# Patient Record
Sex: FEMALE | Race: WHITE | NOT HISPANIC OR LATINO | URBAN - METROPOLITAN AREA
[De-identification: names, ages, dates, MRNs, and addresses within clinical notes are randomized per-mention and may not be internally consistent; named-entity substitution may affect disease eponyms.]

---

## 2019-01-27 ENCOUNTER — EMERGENCY (EMERGENCY)
Facility: HOSPITAL | Age: 39
LOS: 1 days | Discharge: HOME | End: 2019-01-27
Attending: EMERGENCY MEDICINE | Admitting: EMERGENCY MEDICINE
Payer: COMMERCIAL

## 2019-01-27 VITALS
DIASTOLIC BLOOD PRESSURE: 105 MMHG | HEART RATE: 93 BPM | SYSTOLIC BLOOD PRESSURE: 144 MMHG | TEMPERATURE: 98 F | OXYGEN SATURATION: 100 % | RESPIRATION RATE: 16 BRPM

## 2019-01-27 PROCEDURE — 99284 EMERGENCY DEPT VISIT MOD MDM: CPT | Mod: 25

## 2019-01-27 PROCEDURE — 93010 ELECTROCARDIOGRAM REPORT: CPT

## 2019-01-27 NOTE — ED PROVIDER NOTE - CONSTITUTIONAL, MLM
normal... Well appearing, well nourished, awake, alert, oriented to person, place, time/situation and in no apparent distress. tearful

## 2019-01-27 NOTE — ED PROVIDER NOTE - MEDICAL DECISION MAKING DETAILS
39 yr old female with hx of IBS, GERD, pre-eclampsia and gestational dm presents to ed c/o substernal chest pressure, , left shoulder pain x 1 wk.  seeing outside cardiologist and has echo scheduled monday and stress this thrusday.  pt also been checking bp at home and seeing 140/105 and is scaring her.  admits having a lot of stress at home caring for her children including 4 yr old twin    low cardiac risk disease and pt already has echo and stress this week.  will check ekg and liekly dc.  vss and in ed will not treat mild HTN. PERC NEG.  pt will see cardio and encourage to see psych and stress reduction technique.   also made aware of si symptoms.

## 2019-01-27 NOTE — ED ADULT TRIAGE NOTE - CHIEF COMPLAINT QUOTE
pt arrives w/ c/o elevated bp with left sided shoulder pain. pt states see a cardiologist for re-occurring chest pain and has a stress test ordered for this week. pt states she has been under a lot of stress and has been experiencing dizziness.

## 2019-01-27 NOTE — ED PROVIDER NOTE - OBJECTIVE STATEMENT
39 yr old female with hx of IBS, GERD, pre-eclampsia and gestational dm presents to ed c/o substernal chest pressure, , left shoulder pain x 1 wk.  seeing outside cardiologist and has echo scheduled monday and stress this thrusday.  pt also been checking bp at home and seeing 140/105 and is scaring her.  admits having a lot of stress at home caring for her children including 4 yr old twin.   at bedside states pt is very anxious and pt admits everything stress her out (was tearful at bedside). no ha, no si or HI. no fever, no chills, no visual changes, no headache, no numbness or tingling, no sob, no cough,  no palpitations, no leg swelling, no syncope, no abd pain, no n/v/d, no dysuria, no rashes.  no fam hx of MI but mother and grandmother with cad (pt they are not healthy at baseline)

## 2019-02-11 ENCOUNTER — EMERGENCY (EMERGENCY)
Facility: HOSPITAL | Age: 39
LOS: 1 days | Discharge: HOME | End: 2019-02-11
Attending: EMERGENCY MEDICINE
Payer: COMMERCIAL

## 2019-02-11 VITALS
SYSTOLIC BLOOD PRESSURE: 156 MMHG | RESPIRATION RATE: 18 BRPM | TEMPERATURE: 98 F | DIASTOLIC BLOOD PRESSURE: 92 MMHG | OXYGEN SATURATION: 100 % | HEART RATE: 124 BPM

## 2019-02-11 VITALS
WEIGHT: 182.98 LBS | SYSTOLIC BLOOD PRESSURE: 177 MMHG | HEIGHT: 64 IN | RESPIRATION RATE: 917 BRPM | HEART RATE: 119 BPM | DIASTOLIC BLOOD PRESSURE: 97 MMHG | OXYGEN SATURATION: 98 % | TEMPERATURE: 98 F

## 2019-02-11 DIAGNOSIS — Z98.891 HISTORY OF UTERINE SCAR FROM PREVIOUS SURGERY: Chronic | ICD-10-CM

## 2019-02-11 LAB
ALBUMIN SERPL ELPH-MCNC: 4.2 G/DL — SIGNIFICANT CHANGE UP (ref 3.5–5)
ALP SERPL-CCNC: 68 U/L — SIGNIFICANT CHANGE UP (ref 40–120)
ALT FLD-CCNC: 38 U/L DA — SIGNIFICANT CHANGE UP (ref 10–60)
ANION GAP SERPL CALC-SCNC: 7 MMOL/L — SIGNIFICANT CHANGE UP (ref 5–17)
APPEARANCE UR: CLEAR — SIGNIFICANT CHANGE UP
APTT BLD: 29.5 SEC — SIGNIFICANT CHANGE UP (ref 27.5–36.3)
AST SERPL-CCNC: 30 U/L — SIGNIFICANT CHANGE UP (ref 10–40)
BACTERIA # UR AUTO: ABNORMAL /HPF
BASOPHILS # BLD AUTO: 0.1 K/UL — SIGNIFICANT CHANGE UP (ref 0–0.2)
BASOPHILS NFR BLD AUTO: 0.5 % — SIGNIFICANT CHANGE UP (ref 0–2)
BILIRUB SERPL-MCNC: 0.4 MG/DL — SIGNIFICANT CHANGE UP (ref 0.2–1.2)
BILIRUB UR-MCNC: NEGATIVE — SIGNIFICANT CHANGE UP
BUN SERPL-MCNC: 9 MG/DL — SIGNIFICANT CHANGE UP (ref 7–18)
CALCIUM SERPL-MCNC: 9.2 MG/DL — SIGNIFICANT CHANGE UP (ref 8.4–10.5)
CHLORIDE SERPL-SCNC: 112 MMOL/L — HIGH (ref 96–108)
CK SERPL-CCNC: 212 U/L — SIGNIFICANT CHANGE UP (ref 21–215)
CO2 SERPL-SCNC: 23 MMOL/L — SIGNIFICANT CHANGE UP (ref 22–31)
COLOR SPEC: YELLOW — SIGNIFICANT CHANGE UP
CREAT SERPL-MCNC: 0.7 MG/DL — SIGNIFICANT CHANGE UP (ref 0.5–1.3)
D DIMER BLD IA.RAPID-MCNC: <150 NG/ML DDU — SIGNIFICANT CHANGE UP
DIFF PNL FLD: ABNORMAL
EOSINOPHIL # BLD AUTO: 0 K/UL — SIGNIFICANT CHANGE UP (ref 0–0.5)
EOSINOPHIL NFR BLD AUTO: 0.2 % — SIGNIFICANT CHANGE UP (ref 0–6)
EPI CELLS # UR: SIGNIFICANT CHANGE UP /HPF
GLUCOSE SERPL-MCNC: 114 MG/DL — HIGH (ref 70–99)
GLUCOSE UR QL: NEGATIVE — SIGNIFICANT CHANGE UP
HCG SERPL-ACNC: <1 MIU/ML — SIGNIFICANT CHANGE UP
HCG UR QL: NEGATIVE — SIGNIFICANT CHANGE UP
HCT VFR BLD CALC: 45.7 % — HIGH (ref 34.5–45)
HGB BLD-MCNC: 15.1 G/DL — SIGNIFICANT CHANGE UP (ref 11.5–15.5)
INR BLD: 1.04 RATIO — SIGNIFICANT CHANGE UP (ref 0.88–1.16)
KETONES UR-MCNC: NEGATIVE — SIGNIFICANT CHANGE UP
LEUKOCYTE ESTERASE UR-ACNC: NEGATIVE — SIGNIFICANT CHANGE UP
LYMPHOCYTES # BLD AUTO: 1.7 K/UL — SIGNIFICANT CHANGE UP (ref 1–3.3)
LYMPHOCYTES # BLD AUTO: 12.4 % — LOW (ref 13–44)
MAGNESIUM SERPL-MCNC: 2.2 MG/DL — SIGNIFICANT CHANGE UP (ref 1.6–2.6)
MCHC RBC-ENTMCNC: 29.4 PG — SIGNIFICANT CHANGE UP (ref 27–34)
MCHC RBC-ENTMCNC: 33.1 GM/DL — SIGNIFICANT CHANGE UP (ref 32–36)
MCV RBC AUTO: 88.7 FL — SIGNIFICANT CHANGE UP (ref 80–100)
MONOCYTES # BLD AUTO: 0.5 K/UL — SIGNIFICANT CHANGE UP (ref 0–0.9)
MONOCYTES NFR BLD AUTO: 3.4 % — SIGNIFICANT CHANGE UP (ref 2–14)
NEUTROPHILS # BLD AUTO: 11.2 K/UL — HIGH (ref 1.8–7.4)
NEUTROPHILS NFR BLD AUTO: 83.5 % — HIGH (ref 43–77)
NITRITE UR-MCNC: NEGATIVE — SIGNIFICANT CHANGE UP
PH UR: 6 — SIGNIFICANT CHANGE UP (ref 5–8)
PLATELET # BLD AUTO: 236 K/UL — SIGNIFICANT CHANGE UP (ref 150–400)
POTASSIUM SERPL-MCNC: 3.9 MMOL/L — SIGNIFICANT CHANGE UP (ref 3.5–5.3)
POTASSIUM SERPL-SCNC: 3.9 MMOL/L — SIGNIFICANT CHANGE UP (ref 3.5–5.3)
PROT SERPL-MCNC: 8.5 G/DL — HIGH (ref 6–8.3)
PROT UR-MCNC: 15
PROTHROM AB SERPL-ACNC: 11.6 SEC — SIGNIFICANT CHANGE UP (ref 10–12.9)
RBC # BLD: 5.15 M/UL — SIGNIFICANT CHANGE UP (ref 3.8–5.2)
RBC # FLD: 12.1 % — SIGNIFICANT CHANGE UP (ref 10.3–14.5)
RBC CASTS # UR COMP ASSIST: ABNORMAL /HPF (ref 0–2)
SODIUM SERPL-SCNC: 142 MMOL/L — SIGNIFICANT CHANGE UP (ref 135–145)
SP GR SPEC: 1.01 — SIGNIFICANT CHANGE UP (ref 1.01–1.02)
TROPONIN I SERPL-MCNC: <0.015 NG/ML — SIGNIFICANT CHANGE UP (ref 0–0.04)
UROBILINOGEN FLD QL: NEGATIVE — SIGNIFICANT CHANGE UP
WBC # BLD: 13.4 K/UL — HIGH (ref 3.8–10.5)
WBC # FLD AUTO: 13.4 K/UL — HIGH (ref 3.8–10.5)
WBC UR QL: SIGNIFICANT CHANGE UP /HPF (ref 0–5)

## 2019-02-11 PROCEDURE — 84702 CHORIONIC GONADOTROPIN TEST: CPT

## 2019-02-11 PROCEDURE — 99284 EMERGENCY DEPT VISIT MOD MDM: CPT | Mod: 25

## 2019-02-11 PROCEDURE — 81001 URINALYSIS AUTO W/SCOPE: CPT

## 2019-02-11 PROCEDURE — 85027 COMPLETE CBC AUTOMATED: CPT

## 2019-02-11 PROCEDURE — 84484 ASSAY OF TROPONIN QUANT: CPT

## 2019-02-11 PROCEDURE — 80053 COMPREHEN METABOLIC PANEL: CPT

## 2019-02-11 PROCEDURE — 85610 PROTHROMBIN TIME: CPT

## 2019-02-11 PROCEDURE — 96374 THER/PROPH/DIAG INJ IV PUSH: CPT

## 2019-02-11 PROCEDURE — 71046 X-RAY EXAM CHEST 2 VIEWS: CPT | Mod: 26

## 2019-02-11 PROCEDURE — 81025 URINE PREGNANCY TEST: CPT

## 2019-02-11 PROCEDURE — 93005 ELECTROCARDIOGRAM TRACING: CPT

## 2019-02-11 PROCEDURE — 36415 COLL VENOUS BLD VENIPUNCTURE: CPT

## 2019-02-11 PROCEDURE — 82550 ASSAY OF CK (CPK): CPT

## 2019-02-11 PROCEDURE — 83735 ASSAY OF MAGNESIUM: CPT

## 2019-02-11 PROCEDURE — 85379 FIBRIN DEGRADATION QUANT: CPT

## 2019-02-11 PROCEDURE — 71046 X-RAY EXAM CHEST 2 VIEWS: CPT

## 2019-02-11 PROCEDURE — 85730 THROMBOPLASTIN TIME PARTIAL: CPT

## 2019-02-11 PROCEDURE — 99285 EMERGENCY DEPT VISIT HI MDM: CPT | Mod: 25

## 2019-02-11 RX ORDER — KETOROLAC TROMETHAMINE 30 MG/ML
30 SYRINGE (ML) INJECTION ONCE
Qty: 0 | Refills: 0 | Status: DISCONTINUED | OUTPATIENT
Start: 2019-02-11 | End: 2019-02-11

## 2019-02-11 RX ORDER — DIPHENHYDRAMINE HCL 50 MG
50 CAPSULE ORAL ONCE
Qty: 0 | Refills: 0 | Status: DISCONTINUED | OUTPATIENT
Start: 2019-02-11 | End: 2019-02-15

## 2019-02-11 RX ADMIN — Medication 30 MILLIGRAM(S): at 22:15

## 2019-02-11 RX ADMIN — Medication 1 MILLIGRAM(S): at 20:49

## 2019-02-11 NOTE — ED PROVIDER NOTE - MEDICAL DECISION MAKING DETAILS
40 y/o F pt presents with atypical chest pain with anxiety component. Will check labs, D-dimer, EKG and reassess.

## 2019-02-11 NOTE — ED ADULT NURSE NOTE - NSIMPLEMENTINTERV_GEN_ALL_ED
Implemented All Universal Safety Interventions:  [place] to call system. Call bell, personal items and telephone within reach. Instruct patient to call for assistance. Room bathroom lighting operational. Non-slip footwear when patient is off stretcher. Physically safe environment: no spills, clutter or unnecessary equipment. Stretcher in lowest position, wheels locked, appropriate side rails in place.

## 2019-02-11 NOTE — ED PROVIDER NOTE - PROGRESS NOTE DETAILS
pt with atypical CP, case d/w Dr. Liu covering Dr. Haley, will report to Dr. Haley in am anxiety much improves, pt claims she has white coat syndrome Pt feeling much better, CP improves, will d/c hoem

## 2019-02-11 NOTE — ED PROVIDER NOTE - OBJECTIVE STATEMENT
40 y/o F pt with PMHx of GERD, HPV, HTN, IBS, and PSHx of  and loop electrosurgical excision presents to ED c/o mid chest pain, radiating to L side and L upper back x today. Pt describes pain as constant and reports onset of pain while carrying her bag after leaving work. Pt relays that pain is worse with breathing deeply. Pt does not report any associated shortness of breath, hyperventilation, nor is pain worse with movement. Of note, pt reports recent sick contacts at home, stating that her son was recently dx with the flu. Pt follows up with cardiologist, Dr. Haley, for HTN (not on medications) and reports recent nl stress test and EKG. In ED, pt states she feels improved, however, is c/o mild dizziness. Pt took baby ASA PTA. Patient denies fever, chills, nausea, vomiting, recent travel, OCPs, or any other complaints. LMP: ~3 weeks ago. ALLERGIES: sulfa drugs.

## 2019-02-11 NOTE — ED PROVIDER NOTE - PMH
GERD (gastroesophageal reflux disease)    HPV (human papilloma virus) infection    HTN (hypertension)    IBS (irritable bowel syndrome)    Missed

## 2019-02-25 ENCOUNTER — OUTPATIENT (OUTPATIENT)
Dept: OUTPATIENT SERVICES | Facility: HOSPITAL | Age: 39
LOS: 1 days | Discharge: HOME | End: 2019-02-25

## 2019-02-25 DIAGNOSIS — Z98.891 HISTORY OF UTERINE SCAR FROM PREVIOUS SURGERY: Chronic | ICD-10-CM

## 2019-02-25 PROBLEM — I10 ESSENTIAL (PRIMARY) HYPERTENSION: Chronic | Status: ACTIVE | Noted: 2019-02-11

## 2019-03-21 DIAGNOSIS — F41.1 GENERALIZED ANXIETY DISORDER: ICD-10-CM

## 2021-01-14 NOTE — ED PROVIDER NOTE - CHPI ED SYMPTOMS NEG
Wound Care: Vaseline Validate Triangulation: No Type Of Destruction Used: Curettage Cryotherapy Text: The wound bed was treated with cryotherapy after the biopsy was performed. Was A Bandage Applied: Yes Anesthesia Volume In Cc: 0.2 Curettage Text: The wound bed was treated with curettage after the biopsy was performed. Notification Instructions: Patient will be notified of biopsy results. However, patient instructed to call the office if not contacted within 2 weeks. Biopsy Type: H and E no shortness of breath/no vomiting/no nausea/no chills/no fever Additional Anesthesia Volume In Cc (Will Not Render If 0): 0 Silver Nitrate Text: The wound bed was treated with silver nitrate after the biopsy was performed. Size Of Lesion In Cm: 1.3 Electrodesiccation And Curettage Text: The wound bed was treated with electrodesiccation and curettage after the biopsy was performed. Body Location Override (Optional - Billing Will Still Be Based On Selected Body Map Location If Applicable): left mid back Detail Level: Detailed Body Location Override (Optional - Billing Will Still Be Based On Selected Body Map Location If Applicable): midline upper forehead at hairline Information: Selecting Yes will display possible errors in your note based on the variables you have selected. This validation is only offered as a suggestion for you. PLEASE NOTE THAT THE VALIDATION TEXT WILL BE REMOVED WHEN YOU FINALIZE YOUR NOTE. IF YOU WANT TO FAX A PRELIMINARY NOTE YOU WILL NEED TO TOGGLE THIS TO 'NO' IF YOU DO NOT WANT IT IN YOUR FAXED NOTE. Electrodesiccation Text: The wound bed was treated with electrodesiccation after the biopsy was performed. consent was obtained and risks were reviewed including but not limited to scarring, infection, bleeding, scabbing, incomplete removal, nerve damage and allergy to anesthesia. Hemostasis: Aluminum Chloride Anesthesia Type: 0.5% lidocaine with 1:200,000 epinephrine and a 1:10 solution of 8.4% sodium bicarbonate Post-Care Instructions: I reviewed with the patient in detail post-care instructions. Patient is to keep the biopsy site dry overnight, then wash with soap and water and apply ointment daily until healed. Size Of Lesion In Cm: 0.5 Dressing: bandage Size Of Lesion In Cm: 1 Body Location Override (Optional - Billing Will Still Be Based On Selected Body Map Location If Applicable): right lower anterior chestwall Depth Of Biopsy: dermis Body Location Override (Optional - Billing Will Still Be Based On Selected Body Map Location If Applicable): left chest Billing Type: Third-Party Bill Biopsy Method: double edge Personna blade Size Of Lesion In Cm: 1.5

## 2022-08-17 ENCOUNTER — APPOINTMENT (OUTPATIENT)
Dept: ORTHOPEDIC SURGERY | Facility: CLINIC | Age: 42
End: 2022-08-17

## 2022-08-18 ENCOUNTER — APPOINTMENT (OUTPATIENT)
Dept: ORTHOPEDIC SURGERY | Facility: CLINIC | Age: 42
End: 2022-08-18

## 2022-08-18 ENCOUNTER — TRANSCRIPTION ENCOUNTER (OUTPATIENT)
Age: 42
End: 2022-08-18

## 2022-08-18 DIAGNOSIS — M25.562 PAIN IN LEFT KNEE: ICD-10-CM

## 2022-08-18 DIAGNOSIS — R22.42 LOCALIZED SWELLING, MASS AND LUMP, LEFT LOWER LIMB: ICD-10-CM

## 2022-08-18 PROCEDURE — 99214 OFFICE O/P EST MOD 30 MIN: CPT

## 2022-08-18 PROCEDURE — 73564 X-RAY EXAM KNEE 4 OR MORE: CPT | Mod: LT

## 2022-08-18 PROCEDURE — 99204 OFFICE O/P NEW MOD 45 MIN: CPT

## 2022-08-18 RX ORDER — MELOXICAM 15 MG/1
15 TABLET ORAL DAILY
Qty: 30 | Refills: 0 | Status: ACTIVE | COMMUNITY
Start: 2022-08-18 | End: 1900-01-01

## 2022-08-18 NOTE — ASSESSMENT
[FreeTextEntry1] : I have explained that the symptoms and findings are most consistent with inflammation of the patella which is commonly called chondromalacia or patellofemoral pain syndrome. The condition occurs in patients of all ages for various reasons. It is not unusual for the symptoms to persist for a number of years but are usually intermittent in intensity. I have recommended treatment with some moderation in activities when the symptoms are more severe. In addition, I discussed the use of anti-inflammatory medications which a prescription was sent to their pharmacy. The usage and potential side effects of these medications were explained to the patient. Quadriceps strengthening exercises are also helpful and these were described. Other treatments, including surgery, are occasionally necessary if the symptoms are severe and do not respond to the conservative treatment measures. In some cases arthroscopic procedures can be utilized to improve patellar alignment and tracking and as a result reduce pain.\par In addition I would like to order an MRI of the left leg with and without contrast to further evaluate the soft tissue mass as well as a MRI of the knee to further evaluate the articular cartilage of the patellofemoral articulation

## 2022-08-18 NOTE — PHYSICAL EXAM
[de-identified] : General: Well-nourished, in no apparent distress\par Psych: Clear speech, pleasant mood and affect\par Eyes: Extraocular movements intact, no scleral icterus, pupils are symmetric\par Neurological: Alert and oriented to person, place, and time\par Gait: Normal\par Respiratory: Normal respiratory effort\par Lymph: No lymphedema present\par Skin: No rashes, no lesions, no open skin, no ecchymosis, no erythema.\par \par On examination of the knee: Knee alignment is in slight valgus. There is mild swelling along the anterior aspect of the knee joint with a mild effusion noted. No erythema induration or warmth is noted. There is no pain over the proximal midshaft or distal femur. No pain over the distal femoral condyles. No pain over the medial joint line. No pain over the MCL or LCL or tibial plateau. No pain over the fibular head. No pain over the quadriceps tendon, patellar, patellar tendon or tibial tubercle. No pain over the patellar retinaculum medial. The patella appears to be tracking slightly laterally.  She is tender over the lateral patellofemoral articulation and retinaculum.  She is mildly tender over the lateral knee joint line.  She has no pain over the proximal midshaft or distal tibia fibula ankle or foot.  There is a palpable soft tissue fullness over the anterior aspect of the midportion of her leg which is soft and nontender and is more prominent than the contralateral extremity.  Negative patellar apprehension test. No pain or mass over the popliteal fossa. There is good range of motion of the knee with flexion of 130 degrees of full extension with 5/5 strength of the quadriceps and hamstrings.  There is grinding noted of the patellofemoral articulation with deep knee flexion and extension.  No instability on varus valgus stress and negative anterior and posterior drawer testing. There is no pain over the proximal midshaft or distal tibia or fibula. The calf is soft and nontender with a downgoing Souza test. There is full dorsiflexion plantarflexion inversion and eversion of the ankle and hindfoot with 5/5 strength throughout muscle groups. No pain over the midfoot or forefoot. Sensation is grossly intact throughout to light touch Down Babinski test. 2+ patellar tendon and Achilles tendon reflexes and palpable dorsalis pedis and posterior tibial pulses.\par X-rays done today in the office 3 views of the knee which reveal no acute fractures or dislocations the knee joint is reduced and well aligned and the patella appears to be tracking well\par

## 2022-08-18 NOTE — HISTORY OF PRESENT ILLNESS
[Gradual] : gradual [Dull/Aching] : dull/aching [Localized] : localized [Intermittent] : intermittent [Stairs] : stairs [de-identified] : Ms. WHEELER is a 42 year female who presents today for evaluation of their left knee pain and swelling.  She states that over the past few months she has been having increasing pain along the front and outside aspect of her left knee.  She does describe a fall onto her left knee many years ago.  She feels that with certain positions she does feel as if the knee does shift and has more pain in the front and the outside aspect of her left knee with going up and down stairs.  She denies any catching or locking of the knee.  She does notice some mild intermittent swelling of the knee without any redness or warmth of her knee.\par Over the past few months she has also noticed an area of a soft tissue mass or swelling over the front aspect of her left leg which she feels has remained unchanged and does not cause her any pain or discomfort. [] : no [FreeTextEntry1] : left knee  [FreeTextEntry5] : onset of pain in left knee a few years ago. pt states pain is beginning to bother again now. pt did not seek medical attention after falling a few years ago. denies N/T [de-identified] : crossing left over right knee; kneeling

## 2022-08-25 ENCOUNTER — FORM ENCOUNTER (OUTPATIENT)
Age: 42
End: 2022-08-25

## 2022-08-26 ENCOUNTER — APPOINTMENT (OUTPATIENT)
Dept: MRI IMAGING | Facility: CLINIC | Age: 42
End: 2022-08-26

## 2022-08-26 PROCEDURE — 73721 MRI JNT OF LWR EXTRE W/O DYE: CPT | Mod: LT

## 2022-08-26 PROCEDURE — 73718 MRI LOWER EXTREMITY W/O DYE: CPT | Mod: LT

## 2022-09-07 ENCOUNTER — APPOINTMENT (OUTPATIENT)
Dept: OBGYN | Facility: CLINIC | Age: 42
End: 2022-09-07

## 2022-09-13 ENCOUNTER — APPOINTMENT (OUTPATIENT)
Dept: ORTHOPEDIC SURGERY | Facility: CLINIC | Age: 42
End: 2022-09-13

## 2022-09-13 DIAGNOSIS — M22.2X2 PATELLOFEMORAL DISORDERS, LEFT KNEE: ICD-10-CM

## 2022-09-13 DIAGNOSIS — M25.562 PAIN IN LEFT KNEE: ICD-10-CM

## 2022-09-13 PROCEDURE — 99213 OFFICE O/P EST LOW 20 MIN: CPT

## 2022-09-13 NOTE — ASSESSMENT
[FreeTextEntry1] : After her examination today in the office and review of her radiographs I do think her left knee pain is most likely secondary to patellofemoral symptoms as well as knee pain with a questionable meniscal strain at the meniscocapsular attachment.  I did recommend either using an anti-inflammatory a local cortisone injection as well as starting physical therapy to work on range of motion and strengthening stabilization and local modalities.  She feels that she would like to use the anti-inflammatory intermittently she would like to hold off on having a cortisone injection and see if physical therapy can help her pain and she can use local heat and ice therapy throughout the day I did recommend either a good lightweight supportive knee brace when she is at work and refraining from deep knee bending repeatedly or any higher impact activity such as running or jumping.  She was given a prescription for physical therapy and I would like to see her back in 4 to 6 weeks when she completes her physical therapy regimen or earlier if she notices any worsening pain or has any concerns

## 2022-09-13 NOTE — HISTORY OF PRESENT ILLNESS
[de-identified] : Is here for follow-up of her left knee pain and left leg swelling.  She has had an MRI of both the knee as well as the tibia and fibula which reveals no lesion along the anterior cortex of the left leg.  She continues to have knee pain worse with prolonged walking and deep knee bending.  She denies any catching or locking.  She does notice her some mild swelling without any warmth of her knee. [FreeTextEntry1] : Lt leg/ knee [FreeTextEntry5] : Karely is here for follow up MRI review of the LT leg and knee, pain is still present  [de-identified] : MRI

## 2022-09-13 NOTE — PHYSICAL EXAM
[de-identified] : General: Well-nourished, in no apparent distress\par Psych: Clear speech, pleasant mood and affect\par Eyes: Extraocular movements intact, no scleral icterus, pupils are symmetric\par Neurological: Alert and oriented to person, place, and time\par Gait: Normal\par Respiratory: Normal respiratory effort\par Lymph: No lymphedema present\par Skin: No rashes, no lesions, no open skin, no ecchymosis, no erythema.\par \par On examination of the knee: Knee alignment is in slight valgus. There is mild swelling along the anterior aspect of the knee joint with a mild effusion noted. No erythema induration or warmth is noted. There is no pain over the proximal midshaft or distal femur. No pain over the distal femoral condyles. No pain over the medial joint line. No pain over the MCL or LCL or tibial plateau. No pain over the fibular head. No pain over the quadriceps tendon, patellar, patellar tendon or tibial tubercle. No pain over the patellar retinaculum medial. The patella appears to be tracking slightly laterally.  She is tender over the lateral patellofemoral articulation and retinaculum.  She is mildly tender over the lateral knee joint line.  She has no pain over the proximal midshaft or distal tibia fibula ankle or foot.  There is a palpable soft tissue fullness over the anterior aspect of the midportion of her leg which is soft and nontender and is more prominent than the contralateral extremity.  Negative patellar apprehension test. No pain or mass over the popliteal fossa. There is good range of motion of the knee with flexion of 130 degrees of full extension with 5/5 strength of the quadriceps and hamstrings.  There is grinding noted of the patellofemoral articulation with deep knee flexion and extension.  No instability on varus valgus stress and negative anterior and posterior drawer testing. There is no pain over the proximal midshaft or distal tibia or fibula. The calf is soft and nontender with a downgoing Souza test. There is full dorsiflexion plantarflexion inversion and eversion of the ankle and hindfoot with 5/5 strength throughout muscle groups. No pain over the midfoot or forefoot. Sensation is grossly intact throughout to light touch Down Babinski test. 2+ patellar tendon and Achilles tendon reflexes and palpable dorsalis pedis and posterior tibial pulses.\par \par

## 2024-06-05 NOTE — ED PROVIDER NOTE - CONSTITUTIONAL NEGATIVE STATEMENT, MLM
Render Risk Assessment In Note?: no Detail Level: Detailed Comment: Pt has had psoriasis for 20 years \\nPt has tried and failed Daivobet\\nPt had received a prednisone injection for a different condition which made her flare worse\\nPt was prescribed triamcinolone by another dermatologist which caused exacerbation of the flare\\nCounseled on oral vs intramuscular medication for treatment and lab work they may be needed\\nPt asked about IV treatment\\nPt has previously had TB vaccine in [facility]\\nPt was recently vaccinated for hepatitis B\\nPt does not want to do injectable medication\\nCounseled on Otezla for treatment and will be given starter pack to try.\\nOffered Clobetasol shampoo for the scalp \\nCounseled on light therapy for treatment\\nPt is to f/u in 3 months and if no improvement with Otezla, then try skyrizi or taltz no fever and no chills.

## 2025-01-27 ENCOUNTER — APPOINTMENT (OUTPATIENT)
Dept: OBGYN | Facility: CLINIC | Age: 45
End: 2025-01-27
Payer: COMMERCIAL

## 2025-01-27 ENCOUNTER — LABORATORY RESULT (OUTPATIENT)
Age: 45
End: 2025-01-27

## 2025-01-27 VITALS — BODY MASS INDEX: 33.97 KG/M2 | HEIGHT: 64 IN | WEIGHT: 199 LBS

## 2025-01-27 DIAGNOSIS — Z86.19 PERSONAL HISTORY OF OTHER INFECTIOUS AND PARASITIC DISEASES: ICD-10-CM

## 2025-01-27 PROCEDURE — 90471 IMMUNIZATION ADMIN: CPT

## 2025-01-27 PROCEDURE — 90651 9VHPV VACCINE 2/3 DOSE IM: CPT

## 2025-01-27 PROCEDURE — 99203 OFFICE O/P NEW LOW 30 MIN: CPT | Mod: 25

## 2025-01-27 PROCEDURE — 99459 PELVIC EXAMINATION: CPT

## 2025-02-04 ENCOUNTER — NON-APPOINTMENT (OUTPATIENT)
Age: 45
End: 2025-02-04

## 2025-02-04 LAB
CYTOLOGY CVX/VAG DOC THIN PREP: ABNORMAL
HPV HIGH+LOW RISK DNA PNL CVX: DETECTED

## 2025-08-12 DIAGNOSIS — Z12.39 ENCOUNTER FOR OTHER SCREENING FOR MALIGNANT NEOPLASM OF BREAST: ICD-10-CM

## 2025-08-12 DIAGNOSIS — R92.30 DENSE BREASTS, UNSPECIFIED: ICD-10-CM

## 2025-08-12 DIAGNOSIS — N60.02 SOLITARY CYST OF LEFT BREAST: ICD-10-CM
